# Patient Record
Sex: MALE | URBAN - METROPOLITAN AREA
[De-identification: names, ages, dates, MRNs, and addresses within clinical notes are randomized per-mention and may not be internally consistent; named-entity substitution may affect disease eponyms.]

---

## 2022-08-10 ENCOUNTER — CARE COORDINATION (OUTPATIENT)
Dept: CARDIOLOGY | Facility: CLINIC | Age: 47
End: 2022-08-10

## 2022-08-10 NOTE — PROGRESS NOTES
S/B: Referred by [facility] Ledes[facility]. Jovani TAVERAS    Provider left message and stated images were pushed. Sending to HF RN to review      Plan:    August 11, 2022 - Patient is still admitted will keep a watchful eye and once stabilized and discharged will get him scheduled to see us.   August 16, 2022 - Images have been pushed and are in chart. (xrays and angiograms)

## 2022-08-15 NOTE — PROGRESS NOTES
8.15.22 - Notes indicate patient was discharged from hospital yesterday Will be starting cardiac rehab. Will call him in few days to assess urgency of appt as [place]. Another option may be for a virtual visit to start.    8/23/22 - Called an left message for patient to call.  We received a referral when he was in the hospital but notes indicate he is doing better now.  There are no notes in chart past 8/15 so attempting to see if he still needed a referral or when he has follow up appt with cardiologist there to discuss consult with us.  Due to his age they may still recommend he see us.  Will wait for him to call.    8/24/22 - Left second message for patient to call.  Noted he has follow up appt with Dr. Bergeron/Cardiology on 9/9 and he may wish to discuss whether he still needed to establish with us.  Requested he call us back to let us know.    Spoke with patient.  He seems to be doing well. He did not wish to come for consult with us as yet.  He would like to talk to Dr. Bergeron at his next visit on 9/9 and see if he feels he still needs to see us.  If so, we will see if we can have provider start with virtual appt to assess timing of when he needs to come to [facility] for further testing.     9/16/22 -  Patient was scheduled to see his local cardiologist, Dr. Bergeron, on 9/9/22 but care everywhere notes indicate that appt was canceled and has been rescheduled for 10/24/22.  Will postpone referral until end of month and if we have not heard back from patient we will cancel referral.    10/27/22 - Patient has not returned calls and care everywhere notes indicate he has not made contact with local cardiologist/referring physicians since 9/2022.  Closing referral.